# Patient Record
Sex: FEMALE | URBAN - METROPOLITAN AREA
[De-identification: names, ages, dates, MRNs, and addresses within clinical notes are randomized per-mention and may not be internally consistent; named-entity substitution may affect disease eponyms.]

---

## 2021-07-28 ENCOUNTER — OFFICE VISIT (OUTPATIENT)
Dept: FAMILY MEDICINE CLINIC | Facility: CLINIC | Age: 21
End: 2021-07-28
Payer: COMMERCIAL

## 2021-07-28 VITALS
HEART RATE: 78 BPM | WEIGHT: 113 LBS | BODY MASS INDEX: 20.02 KG/M2 | SYSTOLIC BLOOD PRESSURE: 110 MMHG | DIASTOLIC BLOOD PRESSURE: 72 MMHG | HEIGHT: 63 IN | OXYGEN SATURATION: 100 %

## 2021-07-28 DIAGNOSIS — J45.20 MILD INTERMITTENT ASTHMA WITHOUT COMPLICATION: ICD-10-CM

## 2021-07-28 DIAGNOSIS — Z00.00 ENCOUNTER FOR ROUTINE ADULT HEALTH EXAMINATION WITHOUT ABNORMAL FINDINGS: Primary | ICD-10-CM

## 2021-07-28 DIAGNOSIS — R23.8 EASY BRUISING: ICD-10-CM

## 2021-07-28 PROCEDURE — 3078F DIAST BP <80 MM HG: CPT | Performed by: FAMILY MEDICINE

## 2021-07-28 PROCEDURE — 99385 PREV VISIT NEW AGE 18-39: CPT | Performed by: FAMILY MEDICINE

## 2021-07-28 PROCEDURE — 3008F BODY MASS INDEX DOCD: CPT | Performed by: FAMILY MEDICINE

## 2021-07-28 PROCEDURE — 3074F SYST BP LT 130 MM HG: CPT | Performed by: FAMILY MEDICINE

## 2021-07-28 RX ORDER — ALBUTEROL SULFATE 90 UG/1
AEROSOL, METERED RESPIRATORY (INHALATION) EVERY 6 HOURS PRN
COMMUNITY

## 2021-07-28 RX ORDER — LEVONORGESTREL 52 MG/1
1 INTRAUTERINE DEVICE INTRAUTERINE ONCE
COMMUNITY
Start: 2019-08-28

## 2021-07-28 NOTE — PROGRESS NOTES
HPI:   Aj Carranza is a 22year old female who presents for a complete physical exam.     Previous PCP was Dr. Matthew Hinojosa at [facility], [place], operated by [facility]. Has seasonal allergies and this triggers asthma and also uses her Albuterol for exercise only.  Otherwise asthma is well co History reviewed. No pertinent surgical history.    Family History   Problem Relation Age of Onset   • Breast Cancer Mother 48        Stage 4   • Crohn's Disease Father       Social History:   Social History    Tobacco Use      Smoking status: Never Smoke screening/mammograms and clinical breast exams  -Cervical cancer screening/pap smears  -Adequate calcium and Vitamin D intake to prevent osteoporosis  -Healthy diet including adequate intake of vegetables and fruits, appropriate portion sizes, minimizing h

## 2021-12-16 NOTE — PROGRESS NOTES
HPI:    Patient ID: Alisa Shipman is a 22year old female who presents for anxiety and IUD questions. HPI  Anxiety:   Has been having some anxiety for a while. Notes many triggers. Her mom has been battling cancer for a few years.  Her dad has had rece Pulse 72   Ht 5' 3\" (1.6 m)   Wt 110 lb (49.9 kg)   SpO2 98%   Breastfeeding No   BMI 19.49 kg/m²     PHYSICAL EXAM:   Physical Exam  Constitutional:       General: She is not in acute distress. Appearance: Normal appearance. She is well-developed.  Sh above.     3. Vaginal spotting  -Likely SE of Mirena. Reassurance given.      Meds This Visit:  Requested Prescriptions      No prescriptions requested or ordered in this encounter       Imaging & Referrals:   NAVIGATOR    A total of 38 minutes were spent

## 2021-12-18 RX ORDER — ESCITALOPRAM OXALATE 5 MG/1
5 TABLET ORAL DAILY
Qty: 60 TABLET | Refills: 0 | Status: SHIPPED | OUTPATIENT
Start: 2021-12-18

## 2021-12-24 ENCOUNTER — MED REC SCAN ONLY (OUTPATIENT)
Dept: FAMILY MEDICINE CLINIC | Facility: CLINIC | Age: 21
End: 2021-12-24

## 2022-02-10 PROBLEM — F41.9 ANXIETY AND DEPRESSION: Status: ACTIVE | Noted: 2022-02-10

## 2022-02-10 PROBLEM — F32.A ANXIETY AND DEPRESSION: Status: ACTIVE | Noted: 2022-02-10

## 2022-02-16 ENCOUNTER — PATIENT MESSAGE (OUTPATIENT)
Dept: FAMILY MEDICINE CLINIC | Facility: CLINIC | Age: 22
End: 2022-02-16

## 2022-02-16 RX ORDER — ESCITALOPRAM OXALATE 10 MG/1
10 TABLET ORAL DAILY
Qty: 90 TABLET | Refills: 0 | Status: SHIPPED | OUTPATIENT
Start: 2022-02-16

## 2022-05-23 RX ORDER — ESCITALOPRAM OXALATE 10 MG/1
TABLET ORAL
Qty: 90 TABLET | Refills: 0 | Status: SHIPPED | OUTPATIENT
Start: 2022-05-23

## 2022-05-23 RX ORDER — ESCITALOPRAM OXALATE 5 MG/1
5 TABLET ORAL DAILY
Qty: 30 TABLET | Refills: 1 | OUTPATIENT
Start: 2022-05-23

## 2022-05-24 NOTE — TELEPHONE ENCOUNTER
Refill passed per [facility] protocol. Patient is on escitalopram 10mg. Discontinue 5mg tablets. Requested Prescriptions   Pending Prescriptions Disp Refills    ESCITALOPRAM 5 MG Oral Tab [Pharmacy Med Name: ESCITALOPRAM 5 MG TABLET] 30 tablet 1     Sig: Take 1 tablet (5 mg total) by mouth daily.         Psychiatric Non-Scheduled (Anti-Anxiety) Passed - 5/23/2022  1:24 PM        Passed - Appointment in last 6 or next 3 months           ESCITALOPRAM 10 MG Oral Tab [Pharmacy Med Name: ESCITALOPRAM 10 MG TABLET] 90 tablet 0     Sig: TAKE 1 TABLET BY MOUTH EVERY DAY        Psychiatric Non-Scheduled (Anti-Anxiety) Passed - 5/23/2022  1:24 PM        Passed - Appointment in last 6 or next 3 months               Recent Outpatient Visits              2 months ago LESTER (generalized anxiety disorder)    [address] [place] [address],  Dorothy Mackay DO    Telemedicine    3 months ago LESTER (generalized anxiety disorder)    [address] [place] [address],  Dorothy Mackay DO    Telemedicine    5 months ago [address] Dorothy Mackay DO    Office Visit    9 months ago Encounter for routine adult health examination without abnormal findings    [address] [place] [address], Linda Galloway, [place]    Office Visit

## 2022-08-22 RX ORDER — ESCITALOPRAM OXALATE 10 MG/1
10 TABLET ORAL DAILY
Qty: 90 TABLET | Refills: 0 | Status: SHIPPED | OUTPATIENT
Start: 2022-08-22 | End: 2022-11-28

## 2022-09-01 ENCOUNTER — PATIENT MESSAGE (OUTPATIENT)
Dept: FAMILY MEDICINE CLINIC | Facility: CLINIC | Age: 22
End: 2022-09-01

## 2022-09-02 RX ORDER — ESCITALOPRAM OXALATE 10 MG/1
10 TABLET ORAL DAILY
Qty: 90 TABLET | Refills: 1
Start: 2022-09-02

## 2022-09-26 RX ORDER — FLUTICASONE PROPIONATE 220 UG/1
1 AEROSOL, METERED RESPIRATORY (INHALATION) 2 TIMES DAILY
Qty: 12 G | Refills: 0 | Status: SHIPPED | OUTPATIENT
Start: 2022-09-26

## 2022-09-26 RX ORDER — ALBUTEROL SULFATE 90 UG/1
2 AEROSOL, METERED RESPIRATORY (INHALATION) EVERY 6 HOURS PRN
Qty: 18 G | Refills: 1 | Status: SHIPPED | OUTPATIENT
Start: 2022-09-26

## 2022-09-26 NOTE — TELEPHONE ENCOUNTER
All patients who have not been seen in over a year requesting medication refills need to be contacted for a visit. Please ensure she schedules when she is next home on break, and refills sent.

## 2022-09-29 ENCOUNTER — PATIENT MESSAGE (OUTPATIENT)
Dept: FAMILY MEDICINE CLINIC | Facility: CLINIC | Age: 22
End: 2022-09-29

## 2022-09-29 RX ORDER — ALBUTEROL SULFATE 90 UG/1
2 AEROSOL, METERED RESPIRATORY (INHALATION) EVERY 6 HOURS PRN
Qty: 1 EACH | Refills: 0 | Status: SHIPPED | OUTPATIENT
Start: 2022-09-29

## 2022-10-30 ENCOUNTER — TELEPHONE (OUTPATIENT)
Dept: FAMILY MEDICINE CLINIC | Facility: CLINIC | Age: 22
End: 2022-10-30

## 2022-10-31 RX ORDER — ALBUTEROL SULFATE 90 UG/1
2 AEROSOL, METERED RESPIRATORY (INHALATION) EVERY 6 HOURS PRN
Qty: 6.7 EACH | Refills: 0 | Status: SHIPPED | OUTPATIENT
Start: 2022-10-31

## 2022-10-31 NOTE — TELEPHONE ENCOUNTER
Please call patient to make an appointment, 90 day refill sent, [employer] message sent.   Thank you

## 2022-10-31 NOTE — TELEPHONE ENCOUNTER
Protocol failed or has No Protocol, please review  90 day refill given on 10/31/22, appointment needed for further refills.     Requested Prescriptions   Pending Prescriptions Disp Refills    ALBUTEROL 108 (90 Base) MCG/ACT Inhalation Aero Soln [Pharmacy Med Name: ALBUTEROL HFA (PROVENTIL) INH] 6.7 each 0     Sig: INHALE 2 PUFFS INTO THE LUNGS EVERY 6 HOURS AS NEEDED FOR WHEEZE       Asthma & COPD Medication Protocol Failed - 10/30/2022 10:16 AM        Failed - In person appointment or virtual visit in the past 6 mos or appointment in next 3 mos     Recent Outpatient Visits              7 months ago LESTER (generalized anxiety disorder)    [address] [place] [address] [address] Janes Cardoza DO    Telemedicine    8 months ago LESTER (generalized anxiety disorder)    [address] [place] [address] [address] Janes Cardoza DO    Telemedicine    10 months ago [id number] [facility] Janes Cardoza DO    Office Visit    1 year ago Encounter for routine adult health examination without abnormal findings    [address], DO    Office Visit                           Recent Outpatient Visits              7 months ago LESTER (generalized anxiety disorder)    [address] [place] [address], [address] Dorothy Mackay,     Telemedicine    8 months ago LESTER (generalized anxiety disorder)    [address] [place] [address] [address] Janes Cardoza DO    Telemedicine    10 months ago [id number] [facility] Janes Cardoza DO    Office Visit    1 year ago Encounter for routine adult health examination without abnormal findings    [address] [place] [address], [facility] [place], [place]    Office Visit

## 2022-11-23 ENCOUNTER — OFFICE VISIT (OUTPATIENT)
Dept: FAMILY MEDICINE CLINIC | Facility: CLINIC | Age: 22
End: 2022-11-23
Payer: COMMERCIAL

## 2022-11-23 VITALS
HEIGHT: 63 IN | DIASTOLIC BLOOD PRESSURE: 56 MMHG | BODY MASS INDEX: 21.44 KG/M2 | SYSTOLIC BLOOD PRESSURE: 90 MMHG | OXYGEN SATURATION: 98 % | HEART RATE: 57 BPM | WEIGHT: 121 LBS

## 2022-11-23 DIAGNOSIS — G47.00 INSOMNIA, UNSPECIFIED TYPE: ICD-10-CM

## 2022-11-23 DIAGNOSIS — Z00.00 PHYSICAL EXAM, ANNUAL: Primary | ICD-10-CM

## 2022-11-23 DIAGNOSIS — F32.1 CURRENT MODERATE EPISODE OF MAJOR DEPRESSIVE DISORDER WITHOUT PRIOR EPISODE (HCC): ICD-10-CM

## 2022-11-23 DIAGNOSIS — J45.30 MILD PERSISTENT ASTHMA WITHOUT COMPLICATION: ICD-10-CM

## 2022-11-23 DIAGNOSIS — F41.1 GAD (GENERALIZED ANXIETY DISORDER): ICD-10-CM

## 2022-11-23 PROCEDURE — 99214 OFFICE O/P EST MOD 30 MIN: CPT | Performed by: FAMILY MEDICINE

## 2022-11-23 PROCEDURE — 3008F BODY MASS INDEX DOCD: CPT | Performed by: FAMILY MEDICINE

## 2022-11-23 PROCEDURE — 3078F DIAST BP <80 MM HG: CPT | Performed by: FAMILY MEDICINE

## 2022-11-23 PROCEDURE — 3074F SYST BP LT 130 MM HG: CPT | Performed by: FAMILY MEDICINE

## 2022-11-23 PROCEDURE — 99395 PREV VISIT EST AGE 18-39: CPT | Performed by: FAMILY MEDICINE

## 2022-11-23 RX ORDER — FLUTICASONE PROPIONATE 220 UG/1
1 AEROSOL, METERED RESPIRATORY (INHALATION) 2 TIMES DAILY
Qty: 12 G | Refills: 2 | Status: SHIPPED | OUTPATIENT
Start: 2022-11-23

## 2023-03-19 ENCOUNTER — PATIENT MESSAGE (OUTPATIENT)
Facility: CLINIC | Age: 23
End: 2023-03-19

## 2023-12-19 ENCOUNTER — NURSE TRIAGE (OUTPATIENT)
Facility: CLINIC | Age: 23
End: 2023-12-19

## 2023-12-19 NOTE — TELEPHONE ENCOUNTER
Pt stated that she was seeing a therapist for about 1 month now and inform pt that she has depression. Pt stated that she lacks motivation not wanting to do much has trouble sleeping and she also feels she has anxiety and will like to discuss with provider. Pt does not have suicidal thoughts. Pt was given a appt with Dr. Paty Ramires for tomorrow.      Future Appointments   Date Time Provider Padma Smith   12/20/2023 10:30 AM Naun Hernandez, DO [facility]       Reason for Disposition   Patient wants to be seen    Protocols used: Depression-[facility]-[place]

## 2023-12-20 PROBLEM — F33.2 SEVERE EPISODE OF RECURRENT MAJOR DEPRESSIVE DISORDER, WITHOUT PSYCHOTIC FEATURES (HCC): Status: ACTIVE | Noted: 2023-12-20

## 2023-12-20 PROBLEM — F41.9 ANXIETY: Status: ACTIVE | Noted: 2022-02-10

## 2024-03-19 NOTE — TELEPHONE ENCOUNTER
Refill passed per [facility] protocol.    Requested Prescriptions   Pending Prescriptions Disp Refills    SERTRALINE 50 MG Oral Tab [Pharmacy Med Name: SERTRALINE HCL 50 MG TABLET] 90 tablet 0     Sig: TAKE 0.5 TABLET BY MOUTH DAILY FOR 7 DAYS, THEN 1 TABLET DAILY.       Psychiatric Non-Scheduled (Anti-Anxiety) Passed - 3/17/2024  6:57 AM        Passed - In person appointment or virtual visit in the past 6 mos or appointment in next 3 mos     Recent Outpatient Visits              3 months ago Anxiety    [facility] Kavitha Baez DO    Office Visit    1 year ago Physical exam, annual    [facility] Garrison Gibbons DO    Office Visit    2 years ago LESTER (generalized anxiety disorder)    [facility] Garrison Gibbons DO    Telemedicine    2 years ago LESTER (generalized anxiety disorder)    [facility] Garrison Gibbons DO    Telemedicine    2 years ago Anxiety    [facility] Garrison Gibbons DO    Office Visit          Future Appointments         Provider Department Appt Notes    In 1 month Garrison Gibbons DO [facility]                Passed - Depression Screening completed within the past 12 months

## 2024-04-15 ENCOUNTER — OFFICE VISIT (OUTPATIENT)
Facility: CLINIC | Age: 24
End: 2024-04-15
Payer: COMMERCIAL

## 2024-04-15 VITALS
HEART RATE: 68 BPM | SYSTOLIC BLOOD PRESSURE: 112 MMHG | DIASTOLIC BLOOD PRESSURE: 72 MMHG | OXYGEN SATURATION: 98 % | HEIGHT: 63 IN | BODY MASS INDEX: 20.2 KG/M2 | WEIGHT: 114 LBS

## 2024-04-15 DIAGNOSIS — Z12.4 CERVICAL CANCER SCREENING: ICD-10-CM

## 2024-04-15 DIAGNOSIS — F41.1 GAD (GENERALIZED ANXIETY DISORDER): ICD-10-CM

## 2024-04-15 DIAGNOSIS — Z00.00 PHYSICAL EXAM, ANNUAL: Primary | ICD-10-CM

## 2024-04-15 PROCEDURE — 88175 CYTOPATH C/V AUTO FLUID REDO: CPT | Performed by: FAMILY MEDICINE

## 2024-04-15 NOTE — PROGRESS NOTES
HPI:   Rohini Manriquez is a 23 year old female who presents for a complete physical exam.     Taking sertraline since 12/2023. Likes it. Feels well with where's she at currently. Seeing therapist once weekly as of 08/2023. Uses lorazepam maybe 2x/month.     Asthma: Uses Flovent only when she's sick. Same with albuterol. Rarely uses albuterol.     Pediatrician was Dr. Ramila Bauer. She believes she may have only received one MenB vaccine. Unsure when last Tdap was.     Last pap: Never  Last mammogram: Never   Contraception:  Mirena IUD placed 08/2019   Previous colonoscopy: Never   History of STD's: No hx or concerns  History of intimate partner violence: None  Family hx of breast, ovarian, cervical or colon CA: See FH  Diet and exercise: Walks dog regularly. Trying to get back into better exercise regimen.   Immunizations:  see above. Will confirm vaccines.     REVIEW OF SYSTEMS:   GENERAL: feels well otherwise   SKIN: denies any unusual skin lesions  EYES: no vision problems  BREAST: negative  LUNGS: denies shortness of breath  CARDIOVASCULAR: denies chest pain  GI: denies abdominal pain,  No constipation or diarrhea, no hematochezia or melena  : negative  NEURO: denies headaches  PSYCHE: see HPI          Current Outpatient Medications   Medication Sig Dispense Refill    sertraline 50 MG Oral Tab Take 1 tablet (50 mg total) by mouth daily. 60 tablet 0    LORazepam 0.5 MG Oral Tab Take 1 tablet (0.5 mg total) by mouth daily as needed for Anxiety (panic attacks). 20 tablet 0    Fluticasone Propionate  MCG/ACT Inhalation Aerosol Inhale 1 puff into the lungs 2 (two) times daily. 12 g 2    albuterol 108 (90 Base) MCG/ACT Inhalation Aero Soln Inhale 2 puffs into the lungs every 6 (six) hours as needed for Wheezing. 6.7 each 0    levonorgestrel (MIRENA, 52 MG,) 20 MCG/24HR Intrauterine IUD 20 mcg (1 each total) by Intrauterine route once.       No Known Allergies   Past Medical History:    Asthma (HCC)      Past  Surgical History:   Procedure Laterality Date    [place] teeth removed        Family History   Problem Relation Age of Onset    Breast Cancer Mother 50        Stage 4    Crohn's Disease Father     Bipolar Disorder Sister     Anxiety Sister       Social History:   Social History     Socioeconomic History    Marital status: Unknown   Tobacco Use    Smoking status: Never    Smokeless tobacco: Never   Vaping Use    Vaping status: Never Used   Substance and Sexual Activity    Alcohol use: Not Currently    Drug use: Not Currently     Types: Cannabis    Sexual activity: Yes     Partners: Male     Birth control/protection: I.U.D.            EXAM:     Wt Readings from Last 6 Encounters:   04/15/24 114 lb (51.7 kg)   12/20/23 118 lb (53.5 kg)   11/23/22 121 lb (54.9 kg)   12/16/21 110 lb (49.9 kg)   07/28/21 113 lb (51.3 kg)     Body mass index is 20.19 kg/m².   /72   Pulse 68   Ht 5' 3\" (1.6 m)   Wt 114 lb (51.7 kg)   LMP 12/17/2023 (Approximate)   SpO2 98%   BMI 20.19 kg/m²     Of note, Arielle Starkey MA was present during physical exam.     GENERAL: well developed, well nourished, in no apparent distress   SKIN: no rashes, no suspicious lesions  HEENT: atraumatic, normocephalic, throat clear; normal dentition. MMM. TM & EAC normal b/l.   EYES: PERRLA, EOMI, conjunctiva are clear  NECK: supple, no adenopathy or thyroid masses   LUNGS: clear to auscultation  CARDIO: RRR without murmur  GI: good bowel sounds, no masses, HSM or tenderness  : introitus is normal, scant discharge, cervix is pink, no adnexal or uterine masses or tenderness. Pap smear collected.  EXTREMITIES: no edema  NEURO: Alert & oriented, motor & sensation grossly intact and symmetric    No results found for: \"CHOLEST\", \"HDL\", \"LDL\", \"TRIGLY\"   ASSESSMENT AND PLAN:   Rohini Manriquez is a 23 year old female who presents for a complete physical exam.  Encounter Diagnoses   Name Primary?    Physical exam, annual Yes    Cervical cancer screening     LESTER  (generalized anxiety disorder)      Orders Placed This Encounter   Procedures    ThinPrep PAP Smear [E]       -LESTER: Now well-controlled. CPM.   -Pap smear collected today.  -Medical record release form signed & faxed today to obtain prior vaccine records.     Discussed with patient the following:  -Breast cancer screening/mammograms and clinical breast exams  -Cervical cancer screening/pap smears  -Colon cancer screening/colonoscopy  -Adequate calcium and Vitamin D intake to prevent osteoporosis  -Bone density screening for osteopenia/osteoporosis  -Healthy diet including adequate intake of vegetables and fruits, appropriate portion sizes, minimizing highly concentrated carbohydrate foods  -Exercising 30 minutes a day most days of the week   -Diabetes screening    -Cholesterol screening   -Recommendation for yearly influenza vaccine  -Need for Tdap once as an adult and Td booster every 10 years    All questions were answered during the visit and the patient verbalizes understanding. She will return in one year for next WWE or sooner as needed.    Meds & Refills for this Visit:  Requested Prescriptions      No prescriptions requested or ordered in this encounter       Imaging & Consults:  None    Garrison Gibbons DO  4/15/2024  11:31 AM

## 2024-06-05 NOTE — TELEPHONE ENCOUNTER
Refill passed per protocol.    Requested Prescriptions   Pending Prescriptions Disp Refills    SERTRALINE 50 MG Oral Tab [Pharmacy Med Name: SERTRALINE HCL 50 MG TABLET] 30 tablet 1     Sig: TAKE 1 TABLET BY MOUTH EVERY DAY       Psychiatric Non-Scheduled (Anti-Anxiety) Passed - 6/1/2024 11:37 AM        Passed - In person appointment or virtual visit in the past 6 mos or appointment in next 3 mos     Recent Outpatient Visits              1 month ago [facility], [facility]     [facility]    5 months ago Anxiety    [facility]     Office Visit    1 year ago Physical exam, annual    [facility]    Office Visit    2 years ago LESTER (generalized anxiety disorder)    [facility]    Telemedicine    2 years ago LESTER (generalized anxiety disorder)    [facility],     Telemedicine                      Passed - Depression Screening completed within the past 12 months               Recent Outpatient Visits              1 month ago [facility] exam, annual    [facility]     [facility]    5 months ago Anxiety    [facility]     Office Visit    1 year ago Physical exam, annual    [facility]    Office Visit    2 years ago LESTER (generalized anxiety disorder)    [facility]    Telemedicine    2 years ago LESTER (generalized anxiety disorder)    [facility]    Tele[facility]icine

## 2024-08-09 ENCOUNTER — TELEPHONE (OUTPATIENT)
Facility: CLINIC | Age: 24
End: 2024-08-09

## 2024-08-09 DIAGNOSIS — D22.9 SKIN MOLE: Primary | ICD-10-CM

## 2024-08-09 NOTE — TELEPHONE ENCOUNTER
Patient  mother Brian moreno ( name and date of birth of patient verified ) on Release of Information       Mother states jocelyne hs 2 ne moles .. One on face and one on back and is requesting a referral for Dermatologist   Dr. Belinda Harris ( [facility] [phone]        Referral pended for your review, completion and approval.

## (undated) NOTE — LETTER
ASTHMA ACTION PLAN for [facility]     : 2000     Date: 22  Doctor:  Bre Coronel DO  Phone for doctor or clinic: [address], [place] [facility], [address] [place] [address] Medications But ADD:   Medicine not helping  Breathing is hard and fast  Nose opens wide  Can't walk  Ribs show  Can't talk well Medicine How much to take When to take it    If your symptoms do not improve in ONE hour -  go to the emergency room or call 91